# Patient Record
(demographics unavailable — no encounter records)

---

## 2019-11-10 NOTE — NUR
Patient given written and verbal discharge instructions and verbalizes 
understanding.  ER MD Dr. Braden discussed with patient the results and 
treatment provided. Patient in stable condition. ID arm band removed. 

Rx of tylenol given. Patient educated on pain management and to follow up with 
PMD. Pain Scale 0/10.

Opportunity for questions provided and answered. Medication side effect fact 
sheet provided.

## 2019-11-10 NOTE — NUR
Pt came to the ED for sustaining multiple injuries to the R arm and leg from a 
shower door injury. Reports that the shower door was confirmed by contractor 
that it is stable however, during shower, the door fell on pt.  Denies LOC. 
Reports that pt has a small hematoma on R forehead. Pt is currently resting in 
moms arms and crying when touched. Pt has various lacerations on R arm. Pt has 
an avulsion on R leg. Denies n/v/d or fever. NO other complaints/injuries 
noted. Will cont. to monitor.

## 2019-11-10 NOTE — NUR
Dr. Braden placed total of 4 sutures on R arm. pt had avulsion on R leg 2cm x 
3cm. and multiple lacerations on R arm. 1st 1 cm and .75 cm. Reports he cannot 
perform suture on R leg due to "unable to pull skin together." per Dr. Braden. 
Reports that "the flap will eventually fall off."

## 2019-11-10 NOTE — NUR
-------------------------------------------------------------------------------

           *** Note undone in EDM - 11/10/19 at 0556 by [email or web address] ***            

-------------------------------------------------------------------------------

Patient given written and verbal discharge instructions and verbalizes 
understanding.  ER MD Dr. Braden discussed with patient the results and 
treatment provided. Patient in stable condition. ID arm band removed. 

Rx of tylenol given. Patient educated on pain management and to follow up with 
PMD. Pain Scale 0/10.

Opportunity for questions provided and answered. Medication side effect fact 
sheet provided.

## 2019-11-10 NOTE — NUR
-------------------------------------------------------------------------------

           *** Note undone in ED - 11/10/19 at 0351 by [email or web address] ***            

-------------------------------------------------------------------------------

Pt bib mother to bed 5 for evaluation